# Patient Record
Sex: MALE | Race: AMERICAN INDIAN OR ALASKA NATIVE | NOT HISPANIC OR LATINO
[De-identification: names, ages, dates, MRNs, and addresses within clinical notes are randomized per-mention and may not be internally consistent; named-entity substitution may affect disease eponyms.]

---

## 2022-08-12 ENCOUNTER — APPOINTMENT (OUTPATIENT)
Dept: ORTHOPEDIC SURGERY | Facility: CLINIC | Age: 67
End: 2022-08-12

## 2022-08-12 VITALS — BODY MASS INDEX: 29.12 KG/M2 | HEIGHT: 72 IN | WEIGHT: 215 LBS

## 2022-08-12 DIAGNOSIS — S33.5XXA SPRAIN OF LIGAMENTS OF LUMBAR SPINE, INITIAL ENCOUNTER: ICD-10-CM

## 2022-08-12 DIAGNOSIS — M51.36 OTHER INTERVERTEBRAL DISC DEGENERATION, LUMBAR REGION: ICD-10-CM

## 2022-08-12 PROBLEM — Z00.00 ENCOUNTER FOR PREVENTIVE HEALTH EXAMINATION: Status: ACTIVE | Noted: 2022-08-12

## 2022-08-12 PROCEDURE — 72100 X-RAY EXAM L-S SPINE 2/3 VWS: CPT

## 2022-08-12 PROCEDURE — 72170 X-RAY EXAM OF PELVIS: CPT

## 2022-08-12 PROCEDURE — 73562 X-RAY EXAM OF KNEE 3: CPT | Mod: LT

## 2022-08-12 PROCEDURE — 99204 OFFICE O/P NEW MOD 45 MIN: CPT | Mod: 25

## 2022-08-12 PROCEDURE — 20610 DRAIN/INJ JOINT/BURSA W/O US: CPT

## 2022-08-12 RX ORDER — METHOCARBAMOL 750 MG/1
750 TABLET, FILM COATED ORAL TWICE DAILY
Qty: 60 | Refills: 0 | Status: ACTIVE | COMMUNITY
Start: 2022-08-12 | End: 1900-01-01

## 2022-08-12 RX ORDER — METHYLPREDNISOLONE 4 MG/1
4 TABLET ORAL
Qty: 1 | Refills: 0 | Status: ACTIVE | COMMUNITY
Start: 2022-08-12 | End: 1900-01-01

## 2022-08-12 NOTE — PHYSICAL EXAM
[Facet arthropathy] : Facet arthropathy [Disc space narrowing] : Disc space narrowing [There are no fractures, subluxations or dislocations. No significant abnormalities are seen] : There are no fractures, subluxations or dislocations. No significant abnormalities are seen [5___] : hamstring 5[unfilled]/5 [Equivocal] : equivocal Angeles [Bilateral] : knee bilaterally [AP] : anteroposterior [Lateral] : lateral [Moderate tricompartmental OA medial narrowing] : Moderate tricompartmental OA medial narrowing [] : negative Lachmann [TWNoteComboBox7] : flexion 125 degrees [de-identified] : extension 3 degrees

## 2022-08-12 NOTE — HISTORY OF PRESENT ILLNESS
[Lower back] : lower back [de-identified] : 8-12-22- 2 week history of lower back pain with radicular complaints after heavy lifting. He has tried otc nsaids with minimal help. states pain also into the left knee [] : no [FreeTextEntry1] : LT knee  [FreeTextEntry5] : Pt states he was lifting something heavy and started having pain on his L-spine radiating down his leg.

## 2022-08-12 NOTE — IMAGING
[Degenerative change] : Degenerative change [Moderate tricompartmental OA medial narrowing] : Moderate tricompartmental OA medial narrowing [FreeTextEntry1] : multi level ddd with ant osteophyte formation noted [de-identified] : normal

## 2022-08-16 ENCOUNTER — APPOINTMENT (OUTPATIENT)
Dept: ORTHOPEDIC SURGERY | Facility: CLINIC | Age: 67
End: 2022-08-16

## 2022-09-09 ENCOUNTER — APPOINTMENT (OUTPATIENT)
Dept: ORTHOPEDIC SURGERY | Facility: CLINIC | Age: 67
End: 2022-09-09

## 2022-09-09 VITALS — WEIGHT: 215 LBS | HEIGHT: 72 IN | BODY MASS INDEX: 29.12 KG/M2

## 2022-09-09 DIAGNOSIS — M54.16 RADICULOPATHY, LUMBAR REGION: ICD-10-CM

## 2022-09-09 PROCEDURE — 99213 OFFICE O/P EST LOW 20 MIN: CPT

## 2022-09-09 NOTE — HISTORY OF PRESENT ILLNESS
[Lower back] : lower back [de-identified] : 8-12-22- 2 week history of lower back pain with radicular complaints after heavy lifting. He has tried otc nsaids with minimal help. states pain also into the left knee [] : no [FreeTextEntry1] : LT knee  [FreeTextEntry5] : Pt states he was lifting something heavy and started having pain on his L-spine radiating down his leg.

## 2022-09-09 NOTE — PHYSICAL EXAM
[Facet arthropathy] : Facet arthropathy [Disc space narrowing] : Disc space narrowing [There are no fractures, subluxations or dislocations. No significant abnormalities are seen] : There are no fractures, subluxations or dislocations. No significant abnormalities are seen [5___] : hamstring 5[unfilled]/5 [Equivocal] : equivocal Angeles [Bilateral] : knee bilaterally [AP] : anteroposterior [Lateral] : lateral [Moderate tricompartmental OA medial narrowing] : Moderate tricompartmental OA medial narrowing [FreeTextEntry8] : improved [] : negative Lachmann [TWNoteComboBox7] : flexion 125 degrees [de-identified] : extension 3 degrees

## 2022-09-09 NOTE — IMAGING
[Degenerative change] : Degenerative change [Moderate tricompartmental OA medial narrowing] : Moderate tricompartmental OA medial narrowing [FreeTextEntry1] : multi level ddd with ant osteophyte formation noted [de-identified] : normal

## 2022-11-28 ENCOUNTER — APPOINTMENT (OUTPATIENT)
Dept: ORTHOPEDIC SURGERY | Facility: CLINIC | Age: 67
End: 2022-11-28
Payer: MEDICARE

## 2022-11-28 PROCEDURE — 20610 DRAIN/INJ JOINT/BURSA W/O US: CPT | Mod: 50

## 2022-11-28 NOTE — PROCEDURE
[Large Joint Injection] : Large joint injection [Bilateral] : bilaterally of the [Knee] : knee [Pain] : pain [Inflammation] : inflammation [X-ray evidence of Osteoarthritis on this or prior visit] : x-ray evidence of Osteoarthritis on this or prior visit [Alcohol] : alcohol [Betadine] : betadine [Ethyl Chloride sprayed topically] : ethyl chloride sprayed topically [Sterile technique used] : sterile technique used [Synvisc (16mg)] : 16mg of Synvisc [#1] : series #1 [] : Patient tolerated procedure well [Call if redness, pain or fever occur] : call if redness, pain or fever occur [Apply ice for 15min out of every hour for the next 12-24 hours as tolerated] : apply ice for 15 minutes out of every hour for the next 12-24 hours as tolerated [Patient was advised to rest the joint(s) for ____ days] : patient was advised to rest the joint(s) for [unfilled] days [Previous OTC use and PT nontherapeutic] : patient has tried OTC's including aspirin, Ibuprofen, Aleve, etc or prescription NSAIDS, and/or exercises at home and/or physical therapy without satisfactory response [Patient had decreased mobility in the joint] : patient had decreased mobility in the joint [Risks, benefits, alternatives discussed / Verbal consent obtained] : the risks benefits, and alternatives have been discussed, and verbal consent was obtained

## 2022-11-28 NOTE — IMAGING
[Degenerative change] : Degenerative change [Moderate tricompartmental OA medial narrowing] : Moderate tricompartmental OA medial narrowing [FreeTextEntry1] : multi level ddd with ant osteophyte formation noted [de-identified] : normal

## 2022-11-28 NOTE — PHYSICAL EXAM
[Facet arthropathy] : Facet arthropathy [Disc space narrowing] : Disc space narrowing [There are no fractures, subluxations or dislocations. No significant abnormalities are seen] : There are no fractures, subluxations or dislocations. No significant abnormalities are seen [5___] : hamstring 5[unfilled]/5 [Equivocal] : equivocal Angeles [Bilateral] : knee bilaterally [AP] : anteroposterior [Lateral] : lateral [Moderate tricompartmental OA medial narrowing] : Moderate tricompartmental OA medial narrowing [FreeTextEntry8] : improved [] : negative Lachmann [TWNoteComboBox7] : flexion 125 degrees [de-identified] : extension 3 degrees

## 2022-11-28 NOTE — HISTORY OF PRESENT ILLNESS
[Lower back] : lower back [de-identified] : 11-28-22- for b/l knee synvisc #1\par \par 8-12-22- 2 week history of lower back pain with radicular complaints after heavy lifting. He has tried otc nsaids with minimal help. states pain also into the left knee [] : no [FreeTextEntry1] : LT knee  [FreeTextEntry5] : Pt states he was lifting something heavy and started having pain on his L-spine radiating down his leg.

## 2022-12-05 ENCOUNTER — APPOINTMENT (OUTPATIENT)
Dept: ORTHOPEDIC SURGERY | Facility: CLINIC | Age: 67
End: 2022-12-05
Payer: MEDICARE

## 2022-12-05 PROCEDURE — 20610 DRAIN/INJ JOINT/BURSA W/O US: CPT | Mod: 50

## 2022-12-05 NOTE — IMAGING
[Degenerative change] : Degenerative change [Moderate tricompartmental OA medial narrowing] : Moderate tricompartmental OA medial narrowing [FreeTextEntry1] : multi level ddd with ant osteophyte formation noted [de-identified] : normal

## 2022-12-05 NOTE — HISTORY OF PRESENT ILLNESS
[Lower back] : lower back [de-identified] : 12-5-22- for b/l knee synvisc #2\par \par 8-12-22- 2 week history of lower back pain with radicular complaints after heavy lifting. He has tried otc nsaids with minimal help. states pain also into the left knee [] : no [FreeTextEntry1] : LT knee  [FreeTextEntry5] : Pt states he was lifting something heavy and started having pain on his L-spine radiating down his leg.

## 2022-12-05 NOTE — PHYSICAL EXAM
[Facet arthropathy] : Facet arthropathy [Disc space narrowing] : Disc space narrowing [There are no fractures, subluxations or dislocations. No significant abnormalities are seen] : There are no fractures, subluxations or dislocations. No significant abnormalities are seen [5___] : hamstring 5[unfilled]/5 [Equivocal] : equivocal Angeles [Bilateral] : knee bilaterally [AP] : anteroposterior [Lateral] : lateral [Moderate tricompartmental OA medial narrowing] : Moderate tricompartmental OA medial narrowing [FreeTextEntry8] : improved [] : negative Lachmann [TWNoteComboBox7] : flexion 125 degrees [de-identified] : extension 3 degrees

## 2022-12-05 NOTE — PROCEDURE
[Large Joint Injection] : Large joint injection [Bilateral] : bilaterally of the [Knee] : knee [Pain] : pain [Inflammation] : inflammation [X-ray evidence of Osteoarthritis on this or prior visit] : x-ray evidence of Osteoarthritis on this or prior visit [Alcohol] : alcohol [Betadine] : betadine [Ethyl Chloride sprayed topically] : ethyl chloride sprayed topically [Sterile technique used] : sterile technique used [Synvisc (16mg)] : 16mg of Synvisc [#2] : series #2 [] : Patient tolerated procedure well [Call if redness, pain or fever occur] : call if redness, pain or fever occur [Apply ice for 15min out of every hour for the next 12-24 hours as tolerated] : apply ice for 15 minutes out of every hour for the next 12-24 hours as tolerated [Patient was advised to rest the joint(s) for ____ days] : patient was advised to rest the joint(s) for [unfilled] days [Previous OTC use and PT nontherapeutic] : patient has tried OTC's including aspirin, Ibuprofen, Aleve, etc or prescription NSAIDS, and/or exercises at home and/or physical therapy without satisfactory response [Patient had decreased mobility in the joint] : patient had decreased mobility in the joint [Risks, benefits, alternatives discussed / Verbal consent obtained] : the risks benefits, and alternatives have been discussed, and verbal consent was obtained

## 2022-12-12 ENCOUNTER — APPOINTMENT (OUTPATIENT)
Dept: ORTHOPEDIC SURGERY | Facility: CLINIC | Age: 67
End: 2022-12-12
Payer: MEDICARE

## 2022-12-12 DIAGNOSIS — M17.11 UNILATERAL PRIMARY OSTEOARTHRITIS, RIGHT KNEE: ICD-10-CM

## 2022-12-12 DIAGNOSIS — M17.12 UNILATERAL PRIMARY OSTEOARTHRITIS, LEFT KNEE: ICD-10-CM

## 2022-12-12 PROCEDURE — ZZZZZ: CPT

## 2022-12-12 NOTE — PROCEDURE
[Large Joint Injection] : Large joint injection [Bilateral] : bilaterally of the [Knee] : knee [Pain] : pain [Inflammation] : inflammation [X-ray evidence of Osteoarthritis on this or prior visit] : x-ray evidence of Osteoarthritis on this or prior visit [Alcohol] : alcohol [Betadine] : betadine [Ethyl Chloride sprayed topically] : ethyl chloride sprayed topically [Sterile technique used] : sterile technique used [Synvisc (16mg)] : 16mg of Synvisc [#3] : series #3 [] : Patient tolerated procedure well [Call if redness, pain or fever occur] : call if redness, pain or fever occur [Apply ice for 15min out of every hour for the next 12-24 hours as tolerated] : apply ice for 15 minutes out of every hour for the next 12-24 hours as tolerated [Patient was advised to rest the joint(s) for ____ days] : patient was advised to rest the joint(s) for [unfilled] days [Previous OTC use and PT nontherapeutic] : patient has tried OTC's including aspirin, Ibuprofen, Aleve, etc or prescription NSAIDS, and/or exercises at home and/or physical therapy without satisfactory response [Patient had decreased mobility in the joint] : patient had decreased mobility in the joint [Risks, benefits, alternatives discussed / Verbal consent obtained] : the risks benefits, and alternatives have been discussed, and verbal consent was obtained

## 2022-12-12 NOTE — PHYSICAL EXAM
[Facet arthropathy] : Facet arthropathy [Disc space narrowing] : Disc space narrowing [There are no fractures, subluxations or dislocations. No significant abnormalities are seen] : There are no fractures, subluxations or dislocations. No significant abnormalities are seen [5___] : hamstring 5[unfilled]/5 [Equivocal] : equivocal Angeles [Bilateral] : knee bilaterally [AP] : anteroposterior [Lateral] : lateral [Moderate tricompartmental OA medial narrowing] : Moderate tricompartmental OA medial narrowing [FreeTextEntry8] : improved [] : negative Lachmann [TWNoteComboBox7] : flexion 125 degrees [de-identified] : extension 3 degrees

## 2022-12-12 NOTE — HISTORY OF PRESENT ILLNESS
[de-identified] : 12/12/22- for b/l synvisc #3, denies complication with prior injection\par \par 12-5-22- for b/l knee synvisc #2\par \par 8-12-22- 2 week history of lower back pain with radicular complaints after heavy lifting. He has tried otc nsaids with minimal help. states pain also into the left knee

## 2022-12-12 NOTE — ASSESSMENT
[FreeTextEntry1] : Synvisc #3 today, post injection instructions given.\par F/u 6 weeks if symptoms persist.

## 2024-03-18 ENCOUNTER — RX ONLY (RX ONLY)
Age: 69
End: 2024-03-18

## 2024-03-18 ENCOUNTER — OFFICE (OUTPATIENT)
Dept: URBAN - METROPOLITAN AREA CLINIC 112 | Facility: CLINIC | Age: 69
Setting detail: OPHTHALMOLOGY
End: 2024-03-18
Payer: MEDICARE

## 2024-03-18 DIAGNOSIS — H35.033: ICD-10-CM

## 2024-03-18 DIAGNOSIS — H25.13: ICD-10-CM

## 2024-03-18 DIAGNOSIS — H16.223: ICD-10-CM

## 2024-03-18 DIAGNOSIS — H25.11: ICD-10-CM

## 2024-03-18 PROBLEM — H16.221 DRY EYE SYNDROME K SICCA; RIGHT EYE, LEFT EYE, BOTH EYES: Status: ACTIVE | Noted: 2024-03-18

## 2024-03-18 PROBLEM — H16.222 DRY EYE SYNDROME K SICCA; RIGHT EYE, LEFT EYE, BOTH EYES: Status: ACTIVE | Noted: 2024-03-18

## 2024-03-18 PROCEDURE — 99214 OFFICE O/P EST MOD 30 MIN: CPT | Performed by: OPHTHALMOLOGY

## 2024-03-18 PROCEDURE — 92136 OPHTHALMIC BIOMETRY: CPT | Mod: TC | Performed by: OPHTHALMOLOGY

## 2024-03-18 PROCEDURE — 92136 OPHTHALMIC BIOMETRY: CPT | Mod: RT | Performed by: OPHTHALMOLOGY

## 2024-03-18 PROCEDURE — 92250 FUNDUS PHOTOGRAPHY W/I&R: CPT | Performed by: OPHTHALMOLOGY

## 2024-03-18 ASSESSMENT — REFRACTION_CURRENTRX
OS_CYLINDER: -1.25
OD_OVR_VA: 20/
OD_VPRISM_DIRECTION: BF
OD_SPHERE: +3.25
OS_OVR_VA: 20/
OS_VPRISM_DIRECTION: BF
OS_ADD: +3.00
OS_AXIS: 090
OS_SPHERE: +3.50
OD_CYLINDER: -1.50
OD_ADD: +3.00
OD_AXIS: 079

## 2024-03-18 ASSESSMENT — REFRACTION_MANIFEST
OD_CYLINDER: -1.50
OD_SPHERE: +4.25
OS_VA1: 20/25
OD_AXIS: 097
OS_SPHERE: +4.25
OD_VA1: 20/40
OS_CYLINDER: -1.75
OS_AXIS: 091

## 2024-03-18 ASSESSMENT — SPHEQUIV_DERIVED
OD_SPHEQUIV: 3.5
OS_SPHEQUIV: 3.375

## 2024-04-03 ENCOUNTER — ASC (OUTPATIENT)
Dept: URBAN - METROPOLITAN AREA SURGERY 8 | Facility: SURGERY | Age: 69
Setting detail: OPHTHALMOLOGY
End: 2024-04-03
Payer: MEDICARE

## 2024-04-03 DIAGNOSIS — H25.11: ICD-10-CM

## 2024-04-03 PROCEDURE — 66984 XCAPSL CTRC RMVL W/O ECP: CPT | Mod: RT | Performed by: OPHTHALMOLOGY

## 2024-04-04 ENCOUNTER — RX ONLY (RX ONLY)
Age: 69
End: 2024-04-04

## 2024-04-04 ENCOUNTER — OFFICE (OUTPATIENT)
Dept: URBAN - METROPOLITAN AREA CLINIC 112 | Facility: CLINIC | Age: 69
Setting detail: OPHTHALMOLOGY
End: 2024-04-04
Payer: MEDICARE

## 2024-04-04 DIAGNOSIS — Z96.1: ICD-10-CM

## 2024-04-04 DIAGNOSIS — H25.12: ICD-10-CM

## 2024-04-04 PROCEDURE — 99024 POSTOP FOLLOW-UP VISIT: CPT | Performed by: PHYSICIAN ASSISTANT

## 2024-04-12 ENCOUNTER — OFFICE (OUTPATIENT)
Dept: URBAN - METROPOLITAN AREA CLINIC 112 | Facility: CLINIC | Age: 69
Setting detail: OPHTHALMOLOGY
End: 2024-04-12
Payer: MEDICARE

## 2024-04-12 DIAGNOSIS — H25.12: ICD-10-CM

## 2024-04-12 DIAGNOSIS — Z96.1: ICD-10-CM

## 2024-04-12 PROCEDURE — 92136 OPHTHALMIC BIOMETRY: CPT | Mod: LT | Performed by: REGISTERED NURSE

## 2024-04-12 PROCEDURE — 99024 POSTOP FOLLOW-UP VISIT: CPT | Performed by: REGISTERED NURSE

## 2024-04-17 ENCOUNTER — ASC (OUTPATIENT)
Dept: URBAN - METROPOLITAN AREA SURGERY 8 | Facility: SURGERY | Age: 69
Setting detail: OPHTHALMOLOGY
End: 2024-04-17
Payer: MEDICARE

## 2024-04-17 DIAGNOSIS — H25.12: ICD-10-CM

## 2024-04-17 PROCEDURE — 66984 XCAPSL CTRC RMVL W/O ECP: CPT | Mod: 79,LT | Performed by: OPHTHALMOLOGY

## 2024-04-18 ENCOUNTER — OFFICE (OUTPATIENT)
Dept: URBAN - METROPOLITAN AREA CLINIC 112 | Facility: CLINIC | Age: 69
Setting detail: OPHTHALMOLOGY
End: 2024-04-18
Payer: MEDICARE

## 2024-04-18 ENCOUNTER — RX ONLY (RX ONLY)
Age: 69
End: 2024-04-18

## 2024-04-18 DIAGNOSIS — Z96.1: ICD-10-CM

## 2024-04-18 PROBLEM — H25.12 CATARACT SENILE NUCLEAR SCLEROSIS; LEFT EYE: Status: RESOLVED | Noted: 2024-04-04 | Resolved: 2024-04-18

## 2024-04-18 PROBLEM — H25.12 CATARACT NUCLEAR SCLEROSIS AGE RELATED; LEFT EYE: Status: RESOLVED | Noted: 2024-04-12 | Resolved: 2024-04-18

## 2024-04-18 PROCEDURE — 99024 POSTOP FOLLOW-UP VISIT: CPT | Performed by: PHYSICIAN ASSISTANT

## 2024-04-25 ENCOUNTER — OFFICE (OUTPATIENT)
Dept: URBAN - METROPOLITAN AREA CLINIC 112 | Facility: CLINIC | Age: 69
Setting detail: OPHTHALMOLOGY
End: 2024-04-25
Payer: MEDICARE

## 2024-04-25 DIAGNOSIS — Z96.1: ICD-10-CM

## 2024-04-25 PROCEDURE — 99024 POSTOP FOLLOW-UP VISIT: CPT | Performed by: PHYSICIAN ASSISTANT

## 2024-05-20 ENCOUNTER — OFFICE (OUTPATIENT)
Dept: URBAN - METROPOLITAN AREA CLINIC 116 | Facility: CLINIC | Age: 69
Setting detail: OPHTHALMOLOGY
End: 2024-05-20
Payer: MEDICARE

## 2024-05-20 ENCOUNTER — RX ONLY (RX ONLY)
Age: 69
End: 2024-05-20

## 2024-05-20 DIAGNOSIS — Z96.1: ICD-10-CM

## 2024-05-20 PROBLEM — H52.4 PRESBYOPIA: Status: ACTIVE | Noted: 2024-05-20

## 2024-05-20 PROCEDURE — 99024 POSTOP FOLLOW-UP VISIT: CPT | Performed by: OPTOMETRIST

## 2024-05-20 ASSESSMENT — CONFRONTATIONAL VISUAL FIELD TEST (CVF)
OS_FINDINGS: FULL
OD_FINDINGS: FULL

## 2024-06-19 ENCOUNTER — OFFICE (OUTPATIENT)
Dept: URBAN - METROPOLITAN AREA CLINIC 94 | Facility: CLINIC | Age: 69
Setting detail: OPHTHALMOLOGY
End: 2024-06-19
Payer: MEDICARE

## 2024-06-19 DIAGNOSIS — H16.223: ICD-10-CM

## 2024-06-19 DIAGNOSIS — Z96.1: ICD-10-CM

## 2024-06-19 PROCEDURE — 99024 POSTOP FOLLOW-UP VISIT: CPT | Performed by: OPHTHALMOLOGY

## 2024-06-19 ASSESSMENT — CONFRONTATIONAL VISUAL FIELD TEST (CVF)
OD_FINDINGS: FULL
OS_FINDINGS: FULL

## 2024-09-27 ENCOUNTER — OFFICE (OUTPATIENT)
Dept: URBAN - METROPOLITAN AREA CLINIC 112 | Facility: CLINIC | Age: 69
Setting detail: OPHTHALMOLOGY
End: 2024-09-27
Payer: MEDICARE

## 2024-09-27 DIAGNOSIS — Z96.1: ICD-10-CM

## 2024-09-27 DIAGNOSIS — H16.223: ICD-10-CM

## 2024-09-27 DIAGNOSIS — H35.033: ICD-10-CM

## 2024-09-27 PROCEDURE — 99213 OFFICE O/P EST LOW 20 MIN: CPT | Performed by: OPHTHALMOLOGY

## 2024-09-27 ASSESSMENT — CONFRONTATIONAL VISUAL FIELD TEST (CVF)
OD_FINDINGS: FULL
OS_FINDINGS: FULL